# Patient Record
Sex: FEMALE | Race: WHITE | NOT HISPANIC OR LATINO
[De-identification: names, ages, dates, MRNs, and addresses within clinical notes are randomized per-mention and may not be internally consistent; named-entity substitution may affect disease eponyms.]

---

## 2019-05-24 ENCOUNTER — RX RENEWAL (OUTPATIENT)
Age: 69
End: 2019-05-24

## 2019-05-24 PROBLEM — Z00.00 ENCOUNTER FOR PREVENTIVE HEALTH EXAMINATION: Status: ACTIVE | Noted: 2019-05-24

## 2019-06-10 ENCOUNTER — OTHER (OUTPATIENT)
Age: 69
End: 2019-06-10

## 2019-06-14 ENCOUNTER — APPOINTMENT (OUTPATIENT)
Dept: ORTHOPEDIC SURGERY | Facility: CLINIC | Age: 69
End: 2019-06-14
Payer: MEDICARE

## 2019-06-14 VITALS — HEIGHT: 63 IN | WEIGHT: 159 LBS | BODY MASS INDEX: 28.17 KG/M2

## 2019-06-14 PROCEDURE — 20610 DRAIN/INJ JOINT/BURSA W/O US: CPT | Mod: 50

## 2019-06-14 PROCEDURE — 99212 OFFICE O/P EST SF 10 MIN: CPT | Mod: 25

## 2019-06-14 NOTE — HISTORY OF PRESENT ILLNESS
[de-identified] : This patient is here for followup evaluation of both knees. She has osteoarthritis. She's been treated with Orthovisc in the past. The pain is moderate.

## 2019-06-14 NOTE — PHYSICAL EXAM
[de-identified] : bilateral knee examination shows no evidence of warmth or swelling. There is crepitus on range of motion. There is no instability. There is mild joint line tenderness. Neurovascular exam is normal.

## 2019-06-14 NOTE — DISCUSSION/SUMMARY
[de-identified] : The first Orthovisc injection was given into each knee. The patient will follow up in one week for the next injection.

## 2019-06-14 NOTE — REVIEW OF SYSTEMS
[Arthralgia] : arthralgia [Joint Swelling] : no joint swelling [Joint Stiffness] : no joint stiffness [Joint Pain] : joint pain [Negative] : Heme/Lymph

## 2019-06-24 ENCOUNTER — APPOINTMENT (OUTPATIENT)
Dept: ORTHOPEDIC SURGERY | Facility: CLINIC | Age: 69
End: 2019-06-24
Payer: MEDICARE

## 2019-06-24 VITALS — BODY MASS INDEX: 28.17 KG/M2 | WEIGHT: 159 LBS | HEIGHT: 63 IN

## 2019-06-24 PROCEDURE — 99213 OFFICE O/P EST LOW 20 MIN: CPT | Mod: 25

## 2019-06-24 PROCEDURE — 20610 DRAIN/INJ JOINT/BURSA W/O US: CPT | Mod: 50

## 2019-06-24 NOTE — REVIEW OF SYSTEMS
[Arthralgia] : arthralgia [Joint Pain] : joint pain [Joint Stiffness] : no joint stiffness [Joint Swelling] : no joint swelling [Negative] : Endocrine

## 2019-06-24 NOTE — DISCUSSION/SUMMARY
[de-identified] : Bilateral knee injections were given with Orthovisc. This was her second injection. She'll follow up in one week

## 2019-06-24 NOTE — HISTORY OF PRESENT ILLNESS
[de-identified] : Here for bilateral knee pain. His pain with walking up and down stairs no buckling or locking. She had her first Orthovisc injection last week.

## 2019-06-24 NOTE — PHYSICAL EXAM
[de-identified] : Her knee exam shows full range of motion. Crepitus no instability negative Veronique neurovascular exam is normal.

## 2019-06-28 ENCOUNTER — APPOINTMENT (OUTPATIENT)
Dept: ORTHOPEDIC SURGERY | Facility: CLINIC | Age: 69
End: 2019-06-28
Payer: MEDICARE

## 2019-06-28 VITALS — HEIGHT: 63 IN | WEIGHT: 159 LBS | BODY MASS INDEX: 28.17 KG/M2

## 2019-06-28 PROCEDURE — 99212 OFFICE O/P EST SF 10 MIN: CPT | Mod: 25

## 2019-06-28 PROCEDURE — 20610 DRAIN/INJ JOINT/BURSA W/O US: CPT | Mod: 50

## 2019-06-28 NOTE — REVIEW OF SYSTEMS
[Arthralgia] : arthralgia [Joint Pain] : joint pain [Joint Stiffness] : no joint stiffness [Joint Swelling] : no joint swelling [Negative] : Heme/Lymph

## 2019-06-28 NOTE — HISTORY OF PRESENT ILLNESS
[de-identified] : Followup of both knees. She's had 2 Orthovisc injections. She is here for the third.

## 2019-06-28 NOTE — PHYSICAL EXAM
[de-identified] : Bilateral knee exam shows no warmth or swelling. There is joint line tenderness negative Veronique no instability neurovascular exam is normal no crepitus

## 2020-01-24 ENCOUNTER — APPOINTMENT (OUTPATIENT)
Dept: ORTHOPEDIC SURGERY | Facility: CLINIC | Age: 70
End: 2020-01-24
Payer: MEDICARE

## 2020-01-24 VITALS — HEIGHT: 63 IN | WEIGHT: 159 LBS | BODY MASS INDEX: 28.17 KG/M2

## 2020-01-24 DIAGNOSIS — M25.572 PAIN IN LEFT ANKLE AND JOINTS OF LEFT FOOT: ICD-10-CM

## 2020-01-24 PROCEDURE — 73630 X-RAY EXAM OF FOOT: CPT | Mod: LT

## 2020-01-24 PROCEDURE — 99213 OFFICE O/P EST LOW 20 MIN: CPT

## 2020-01-24 NOTE — HISTORY OF PRESENT ILLNESS
[FreeTextEntry1] : Location: left foot\par Quality:  aching\par Duration:01/15/2020\par Context: cabinet fell on foot\par Aggravating Factors:  walking, standing\par Conservative treatment:  rest\par Associated Symptoms:  swelling, bruising\par Prior Studies: n.a\par

## 2020-01-24 NOTE — DISCUSSION/SUMMARY
[de-identified] : This patient has a fifth toe proximal phalanx fracture of the left foot. She will give it time to heal by itself on its own. No swelling she will elevate and ice. I am going to order gel injections for both knees.

## 2020-01-24 NOTE — PHYSICAL EXAM
[de-identified] : Left foot x-ray shows a minimally displaced fracture of the proximal phalanx of the fifth toe

## 2020-02-14 ENCOUNTER — APPOINTMENT (OUTPATIENT)
Dept: ORTHOPEDIC SURGERY | Facility: CLINIC | Age: 70
End: 2020-02-14
Payer: MEDICARE

## 2020-02-14 PROCEDURE — 99212 OFFICE O/P EST SF 10 MIN: CPT | Mod: 25

## 2020-02-14 PROCEDURE — 20610 DRAIN/INJ JOINT/BURSA W/O US: CPT | Mod: 50

## 2020-02-14 NOTE — PHYSICAL EXAM
[Normal] : Gait: normal [de-identified] : Bilateral knee exam shows no warmth or swelling. There is crepitus or joint line pain. There is negative Veronique. Neurovascular exam is normal.

## 2020-02-14 NOTE — DISCUSSION/SUMMARY
[Medication Risks Reviewed] : Medication risks reviewed [de-identified] : The first Orthovisc injection was given into each knee today. Followup in one week. Postinjection instructions given

## 2020-02-14 NOTE — HISTORY OF PRESENT ILLNESS
[de-identified] : This patient has bilateral knee arthritis. She's gotten some relief with Orthovisc. This being done about every 6 months in both knees. The pain is moderate at this point. She's not thinking about surgery at this time. [Pain Location] : pain [Stable] : stable

## 2020-02-14 NOTE — DISCUSSION/SUMMARY
[Medication Risks Reviewed] : Medication risks reviewed [de-identified] : The first Orthovisc injection was given into each knee today. Followup in one week. Postinjection instructions given

## 2020-02-14 NOTE — PHYSICAL EXAM
[Normal] : Gait: normal [de-identified] : Bilateral knee exam shows no warmth or swelling. There is crepitus or joint line pain. There is negative Veronique. Neurovascular exam is normal.

## 2020-02-21 ENCOUNTER — APPOINTMENT (OUTPATIENT)
Dept: ORTHOPEDIC SURGERY | Facility: CLINIC | Age: 70
End: 2020-02-21
Payer: MEDICARE

## 2020-02-21 PROCEDURE — 20610 DRAIN/INJ JOINT/BURSA W/O US: CPT | Mod: 50

## 2020-02-21 PROCEDURE — 99212 OFFICE O/P EST SF 10 MIN: CPT | Mod: 25

## 2020-02-21 NOTE — PHYSICAL EXAM
[Normal] : Gait: normal [de-identified] : Bilateral knee exam shows no warmth or swelling. There is crepitus or joint line pain. There is negative Veronique. Neurovascular exam is normal.

## 2020-02-21 NOTE — REVIEW OF SYSTEMS
[Negative] : Heme/Lymph [Arthralgia] : arthralgia [Joint Pain] : joint pain [Joint Swelling] : no joint swelling

## 2020-02-21 NOTE — DISCUSSION/SUMMARY
[Medication Risks Reviewed] : Medication risks reviewed [de-identified] : The second Orthovisc injection was given into each knee today. Followup in one week. Postinjection instructions given

## 2020-02-21 NOTE — HISTORY OF PRESENT ILLNESS
[de-identified] : She is here for followup of both knees. She had the first injection of Orthovisc last week. Pain is slightly better. She is here for the second [Pain Location] : pain [Improving] : improving

## 2020-03-09 ENCOUNTER — APPOINTMENT (OUTPATIENT)
Dept: ORTHOPEDIC SURGERY | Facility: CLINIC | Age: 70
End: 2020-03-09
Payer: MEDICARE

## 2020-03-09 PROCEDURE — 20610 DRAIN/INJ JOINT/BURSA W/O US: CPT | Mod: 50

## 2020-03-09 PROCEDURE — 99212 OFFICE O/P EST SF 10 MIN: CPT | Mod: 25

## 2020-03-09 NOTE — DISCUSSION/SUMMARY
[Medication Risks Reviewed] : Medication risks reviewed [de-identified] : The third Orthovisc injection was given into each knee today. Followup in 6 months . Postinjection instructions given PAINFUL URINATION/BURNING URINATION/PAIN/URINARY FREQUENCY/DYSURIA

## 2020-03-09 NOTE — PHYSICAL EXAM
[Normal] : Gait: normal [de-identified] : Bilateral knee exam shows no warmth or swelling. There is crepitus or joint line pain. There is negative Veronique. Neurovascular exam is normal.

## 2020-03-09 NOTE — HISTORY OF PRESENT ILLNESS
[de-identified] : She is here for followup of both knees. She's had 2 Orthovisc injections. She is here for the third. Symptoms are better. [Pain Location] : pain [Improving] : improving

## 2020-06-16 ENCOUNTER — TRANSCRIPTION ENCOUNTER (OUTPATIENT)
Age: 70
End: 2020-06-16

## 2020-09-11 ENCOUNTER — APPOINTMENT (OUTPATIENT)
Dept: ORTHOPEDIC SURGERY | Facility: CLINIC | Age: 70
End: 2020-09-11
Payer: MEDICARE

## 2020-09-11 VITALS — HEIGHT: 63 IN | WEIGHT: 159 LBS | BODY MASS INDEX: 28.17 KG/M2

## 2020-09-11 VITALS — TEMPERATURE: 95.2 F

## 2020-09-11 PROCEDURE — 99212 OFFICE O/P EST SF 10 MIN: CPT | Mod: 25

## 2020-09-11 PROCEDURE — 20610 DRAIN/INJ JOINT/BURSA W/O US: CPT | Mod: 50

## 2020-09-11 NOTE — DISCUSSION/SUMMARY
[Medication Risks Reviewed] : Medication risks reviewed [de-identified] : Injection given into both knees today. Followup will be in one week.

## 2020-09-11 NOTE — HISTORY OF PRESENT ILLNESS
[Pain Location] : pain [de-identified] : She is here for followup of both knees. She has osteoarthritis of her she's got no relief with Orthovisc. She is here for the first injection. Pain is unmanageable. [Stable] : stable

## 2020-09-11 NOTE — PHYSICAL EXAM
[de-identified] : Bilateral knee exam shows no warmth or swelling. There is crepitus or joint line pain. There is negative Veronique. Neurovascular exam is normal. [Normal] : Gait: normal

## 2020-09-18 ENCOUNTER — APPOINTMENT (OUTPATIENT)
Dept: ORTHOPEDIC SURGERY | Facility: CLINIC | Age: 70
End: 2020-09-18
Payer: MEDICARE

## 2020-09-18 VITALS — BODY MASS INDEX: 28.17 KG/M2 | WEIGHT: 159 LBS | HEIGHT: 63 IN

## 2020-09-18 PROCEDURE — 20610 DRAIN/INJ JOINT/BURSA W/O US: CPT | Mod: 50

## 2020-09-18 PROCEDURE — 99212 OFFICE O/P EST SF 10 MIN: CPT | Mod: 25

## 2020-09-18 NOTE — PHYSICAL EXAM
[Normal] : Gait: normal [de-identified] : Bilateral knee exam shows no warmth or swelling. There is crepitus or joint line pain. There is negative Veronique. Neurovascular exam is normal.

## 2020-09-18 NOTE — HISTORY OF PRESENT ILLNESS
[de-identified] : She is here for followup of both knees. She has osteoarthritis of her she's got no relief with Orthovisc. She is here for the second injection. Pain is manageable. [Pain Location] : pain [Stable] : stable

## 2020-09-18 NOTE — DISCUSSION/SUMMARY
[Medication Risks Reviewed] : Medication risks reviewed [de-identified] : Injection given into both knees today. Followup will be in one week for the third injection.

## 2020-09-21 NOTE — HISTORY OF PRESENT ILLNESS
[de-identified] : She is here for followup of both knees. She has osteoarthritis of her she's got no relief with Orthovisc. She is here for the second injection. Pain is manageable.

## 2020-09-21 NOTE — DISCUSSION/SUMMARY
[Medication Risks Reviewed] : Medication risks reviewed [de-identified] : Injection given into both knees today. Followup will be in one week for the third injection.

## 2020-09-21 NOTE — PHYSICAL EXAM
[Normal] : Gait: normal [de-identified] : Bilateral knee exam shows no warmth or swelling. There is crepitus or joint line pain. There is negative Veronique. Neurovascular exam is normal.

## 2020-09-28 ENCOUNTER — APPOINTMENT (OUTPATIENT)
Dept: ORTHOPEDIC SURGERY | Facility: CLINIC | Age: 70
End: 2020-09-28
Payer: MEDICARE

## 2020-09-28 PROCEDURE — 99212 OFFICE O/P EST SF 10 MIN: CPT | Mod: 25

## 2020-09-28 PROCEDURE — 20610 DRAIN/INJ JOINT/BURSA W/O US: CPT | Mod: 50

## 2020-09-28 NOTE — HISTORY OF PRESENT ILLNESS
[de-identified] : She is here for followup of both knees. She has osteoarthritis of her knees.  relief with Orthovisc. She is here for the third injection. Pain is manageable. [Pain Location] : pain [Improving] : improving

## 2020-09-28 NOTE — DISCUSSION/SUMMARY
[de-identified] : Third Orthovisc injection was given into each knee today. The patient tolerated. Postinjection instructions given. Followup in 6 months

## 2020-09-28 NOTE — PHYSICAL EXAM
[Normal] : Gait: normal [de-identified] : Bilateral knee exam shows no warmth or swelling. There is crepitus or joint line pain. There is negative Veronique. Neurovascular exam is normal.

## 2021-04-01 ENCOUNTER — APPOINTMENT (OUTPATIENT)
Dept: ORTHOPEDIC SURGERY | Facility: CLINIC | Age: 71
End: 2021-04-01
Payer: MEDICARE

## 2021-04-01 PROCEDURE — 99212 OFFICE O/P EST SF 10 MIN: CPT | Mod: 25

## 2021-04-01 PROCEDURE — 20610 DRAIN/INJ JOINT/BURSA W/O US: CPT | Mod: 50

## 2021-04-01 NOTE — PHYSICAL EXAM
[Normal] : Gait: normal [de-identified] : Bilateral knee exam shows no warmth or swelling. There is crepitus or joint line pain. There is negative Veronique. Neurovascular exam is normal.

## 2021-04-01 NOTE — HISTORY OF PRESENT ILLNESS
[de-identified] : This patient is here for followup evaluation. She has bilateral knee osteoarthritis. She has gotten relief with Orthovisc injections. She is managing his symptoms at this time. [Pain Location] : pain [Stable] : stable

## 2021-04-01 NOTE — DISCUSSION/SUMMARY
[de-identified] : First Orthovisc injection was given into each knee today. The patient tolerated. Postinjection instructions given. Followup in one week

## 2021-04-08 ENCOUNTER — APPOINTMENT (OUTPATIENT)
Dept: ORTHOPEDIC SURGERY | Facility: CLINIC | Age: 71
End: 2021-04-08
Payer: MEDICARE

## 2021-04-08 PROCEDURE — 99212 OFFICE O/P EST SF 10 MIN: CPT | Mod: 25

## 2021-04-08 PROCEDURE — 20610 DRAIN/INJ JOINT/BURSA W/O US: CPT | Mod: 50

## 2021-04-08 NOTE — HISTORY OF PRESENT ILLNESS
[de-identified] : This patient is here for followup evaluation. She has bilateral knee osteoarthritis. She has gotten relief with Orthovisc injections. She is managing his symptoms at this time. [Pain Location] : pain [Stable] : stable

## 2021-04-08 NOTE — DISCUSSION/SUMMARY
[Medication Risks Reviewed] : Medication risks reviewed [de-identified] : Second Orthovisc injection was given into each knee today. The patient tolerated. Postinjection instructions given. Followup in one week\par prescriptions for cyclobenzaprine and diclofenac gel were sent.

## 2021-04-08 NOTE — PHYSICAL EXAM
[Normal] : Gait: normal [de-identified] : Bilateral knee exam shows no warmth or swelling. There is crepitus or joint line pain. There is negative Veronique. Neurovascular exam is normal.

## 2021-04-15 ENCOUNTER — APPOINTMENT (OUTPATIENT)
Dept: ORTHOPEDIC SURGERY | Facility: CLINIC | Age: 71
End: 2021-04-15
Payer: MEDICARE

## 2021-04-15 PROCEDURE — 99212 OFFICE O/P EST SF 10 MIN: CPT | Mod: 25

## 2021-04-15 PROCEDURE — 20610 DRAIN/INJ JOINT/BURSA W/O US: CPT | Mod: 50

## 2021-04-15 NOTE — PHYSICAL EXAM
[Normal] : Gait: normal [de-identified] : Bilateral knee exam shows no warmth or swelling. There is crepitus or joint line pain. There is negative Veronique. Neurovascular exam is normal.

## 2021-04-15 NOTE — HISTORY OF PRESENT ILLNESS
[de-identified] : This patient is here for followup evaluation. She has bilateral knee osteoarthritis. She has gotten relief with Orthovisc injections. She is managing his symptoms at this time. [Pain Location] : pain [Stable] : stable

## 2021-04-15 NOTE — DISCUSSION/SUMMARY
[Medication Risks Reviewed] : Medication risks reviewed [de-identified] : The third Orthovisc injection was given into both knees today. Postinjection instructions given. Follow up will be in 6 months.

## 2021-05-09 NOTE — REASON FOR VISIT
[Follow-Up Visit] : a follow-up visit for [Knee Pain] : knee pain There are no Wet Read(s) to document.

## 2021-06-02 ENCOUNTER — TRANSCRIPTION ENCOUNTER (OUTPATIENT)
Age: 71
End: 2021-06-02

## 2021-06-10 ENCOUNTER — TRANSCRIPTION ENCOUNTER (OUTPATIENT)
Age: 71
End: 2021-06-10

## 2021-08-02 ENCOUNTER — TRANSCRIPTION ENCOUNTER (OUTPATIENT)
Age: 71
End: 2021-08-02

## 2021-08-04 ENCOUNTER — APPOINTMENT (OUTPATIENT)
Dept: ORTHOPEDIC SURGERY | Facility: CLINIC | Age: 71
End: 2021-08-04
Payer: MEDICARE

## 2021-08-04 VITALS — WEIGHT: 159 LBS | HEIGHT: 63 IN | BODY MASS INDEX: 28.17 KG/M2

## 2021-08-04 DIAGNOSIS — M75.52 BURSITIS OF LEFT SHOULDER: ICD-10-CM

## 2021-08-04 PROCEDURE — 99214 OFFICE O/P EST MOD 30 MIN: CPT

## 2021-08-04 PROCEDURE — 73060 X-RAY EXAM OF HUMERUS: CPT | Mod: LT

## 2021-08-04 NOTE — HISTORY OF PRESENT ILLNESS
[de-identified] : Location: left arm/ shoulder\par Quality:  aching \par Duration: 1 month\par Context:  broke her fall with arm \par Aggravating Factors:  ROM , overhead lifting , reaching behind back \par Conservative treatment:  OTC medication , PT \par Associated Symptoms: Stiffness, pain radiating to neck  \par Prior Studies: n.a \par  [Pain Location] : pain [Worsening] : worsening

## 2021-08-04 NOTE — DISCUSSION/SUMMARY
[de-identified] : I am concerned that she may have a left rotator cuff tear. I would have her do an MRI. I will call her with the results. Follow up by telephone.

## 2021-08-04 NOTE — PHYSICAL EXAM
[de-identified] : Left shoulder shows no obvious warmth or swelling. There is tenderness about the glenohumeral joint. She has restricted active range of motion she has weakness of her rotator cuff and forward flexion and external rotation. Internal rotation is preserved. Neurovascular exam is normal. Positive Neer and positive Ellis test [de-identified] : Left humerus x-ray shows no evidence of fracture dislocation

## 2021-08-10 ENCOUNTER — APPOINTMENT (OUTPATIENT)
Dept: ORTHOPEDIC SURGERY | Facility: CLINIC | Age: 71
End: 2021-08-10
Payer: MEDICARE

## 2021-08-10 DIAGNOSIS — M19.019 PRIMARY OSTEOARTHRITIS, UNSPECIFIED SHOULDER: ICD-10-CM

## 2021-08-10 PROCEDURE — 99213 OFFICE O/P EST LOW 20 MIN: CPT

## 2021-08-11 DIAGNOSIS — M75.50 BURSITIS OF UNSPECIFIED SHOULDER: ICD-10-CM

## 2021-08-11 DIAGNOSIS — M75.100 UNSPECIFIED ROTATOR CUFF TEAR OR RUPTURE OF UNSPECIFIED SHOULDER, NOT SPECIFIED AS TRAUMATIC: ICD-10-CM

## 2021-08-11 PROBLEM — M19.019 SHOULDER ARTHRITIS: Status: ACTIVE | Noted: 2021-08-11

## 2021-08-11 NOTE — ASSESSMENT
[FreeTextEntry1] : Lengthy discussion with the patient regarding MRI symptoms history as well as underlying arthritis noticed an MRI.  Treatment options are reviewed in discussion with the patient for 20 minutes regards to nonoperative versus operative treatments.  Recommend arthroscopic rotator cuff repair given the severity of the tendon tears as well as her symptoms.  Discussed the unit for discomfort surgery undertaken she may still have some baseline discomfort given the underlying arthritis and ultimately may require a we'll shoulder replacement were reversed shoulder replacement.  At this time patient would not like to consider any type of arthroplasty and will consider her options in regards to arthroscopic surgery.  Patient informed of risks of delayed to surgery for increased tendon tear size.\par

## 2021-08-11 NOTE — HISTORY OF PRESENT ILLNESS
[de-identified] : Patient is an established patient seen by Dr. Robert with LEFT shoulder pain s/p fall.  She reports persistent discomfort despite rest and anti-inflammatory medication.  She complains of weakness with any overhead use as well.  She denies pain prior to this fall.  She is presenting for further evaluation after MRI was obtained.

## 2021-08-11 NOTE — PHYSICAL EXAM
[de-identified] : Left Shoulder:\par Constitutional:\par The patient is healthy-appearing and in no apparent distress. \par \par Cardiovascular System: \par The capillary refill is less than 2 seconds. \par \par Skin: \par There are no skin abnormalities.\par \par C-Spine/Neck:\par \par Active Range of Motion:\par Flexion				50\par Extension			60\par Lateral rotation			80  \par \par Left Shoulder: \par Inspection: \par There is no atrophy, erythema, warmth, swelling.\par There is no scapular winging.\par There is no AC prominence. \par \par Bony Palpation: \par There is no tenderness of the clavicle.\par There is no tenderness of the acromioclavicular joint.\par There is tenderness of the greater tuberosity. \par There is tenderness of the bicipital groove.\par  \par Soft Tissue Palpation: \par There is no tenderness of the trapezius.\par There is no tenderness of the rhomboid.\par There is tenderness of the subacromial bursa. \par \par Active Range of Motion: \par Forward flexion- 				130 \par Abduction-					90\par External rotation at 0 degrees abduction-	70 \par Internal rotation at 0 degrees abduction-	80\par \par Passive Range of Motion: \par Forward flexion- 			160 \par Abduction-				100\par External rotation at 0 deg abduction-	80 \par Internal rotation at 0 deg abduction-	80\par \par Strength:\par Supraspinatus / Abduction                  4/5\par External rotation                                 4/5\par Internal rotation                                  5/5\par \par Special Tests: \par Hawkin's  				Positive \par Neer's  				Positive \par Speed's  				Negative\par AC cross-over 			            Negative\par Landis's  				Negative\par \par Neurological System: \par \par There is normal sensation to light touch C5-T1. \par \par Stability: \par There is no general laxity. \par \par Psychiatric: \par The patient demonstrates a normal mood and affect and is active and alert [de-identified] : MRI LEFT shoulder ( [facility] ) : there is a high grade partial tear with delamination of the supraspinatus tendon as well as a superior subscapularis full-thickness tear.  There is moderate glenohumeral arthritis with areas of focal small severe focal chondral wear.  there is some acromial bursitis and diffuse tendinitis

## 2021-08-24 ENCOUNTER — LABORATORY RESULT (OUTPATIENT)
Age: 71
End: 2021-08-24

## 2021-08-27 ENCOUNTER — APPOINTMENT (OUTPATIENT)
Age: 71
End: 2021-08-27

## 2021-08-31 ENCOUNTER — APPOINTMENT (OUTPATIENT)
Dept: ORTHOPEDIC SURGERY | Facility: CLINIC | Age: 71
End: 2021-08-31

## 2021-10-21 ENCOUNTER — APPOINTMENT (OUTPATIENT)
Dept: ORTHOPEDIC SURGERY | Facility: CLINIC | Age: 71
End: 2021-10-21
Payer: MEDICARE

## 2021-10-21 PROCEDURE — 20610 DRAIN/INJ JOINT/BURSA W/O US: CPT | Mod: 50

## 2021-10-21 NOTE — PHYSICAL EXAM
[de-identified] : bilateral knee exam shows no warmth or swelling there is crepitus some joint line pain no instability negative Veronique's neurovascular exam is normal

## 2021-10-21 NOTE — HISTORY OF PRESENT ILLNESS
[de-identified] : This patient is here for followup evaluation. She has bilateral knee osteoarthritis. She has gotten relief with Orthovisc injections. She is managing his symptoms at this time.

## 2021-10-21 NOTE — PROCEDURE
[de-identified] : After discussion of the risks and benefits, the patient has elected to proceed with an injection. Confirmed that the patient does not have a history of prior adverse reactions, active infections are relevant allergies. There was no erythema, warmth and the skin was clear. The skin was sterilized with alcohol. Ethyl chloride was used as a topical anesthetic. A needle was inserted. The site was injected with Orthovisc injection into each knee. The injection was completed without complication and a bandage was applied. The patient tolerated the procedure well and was given post injection instructions.\par \par Medications:bilateral knee Orthovisc injections given laterally

## 2021-10-21 NOTE — DISCUSSION/SUMMARY
[Medication Risks Reviewed] : Medication risks reviewed [de-identified] : Orthovisc injections into both knees today. Follow up in a week for the next. Postinjection instructions given.

## 2021-10-28 ENCOUNTER — APPOINTMENT (OUTPATIENT)
Dept: ORTHOPEDIC SURGERY | Facility: CLINIC | Age: 71
End: 2021-10-28
Payer: MEDICARE

## 2021-10-28 PROCEDURE — 20610 DRAIN/INJ JOINT/BURSA W/O US: CPT | Mod: 50

## 2021-10-28 NOTE — PHYSICAL EXAM
[de-identified] : bilateral knee exam shows no warmth or swelling there is crepitus some joint line pain no instability negative Veronique's neurovascular exam is normal

## 2021-10-28 NOTE — PROCEDURE
[de-identified] : After discussion of the risks and benefits, the patient has elected to proceed with an injection. Confirmed that the patient does not have a history of prior adverse reactions, active infections are relevant allergies. There was no erythema, warmth and the skin was clear. The skin was sterilized with alcohol. Ethyl chloride was used as a topical anesthetic. A needle was inserted. The site was injected with Orthovisc injection into each knee. The injection was completed without complication and a bandage was applied. The patient tolerated the procedure well and was given post injection instructions.\par \par Medications:bilateral knee Orthovisc injections given laterally

## 2021-10-28 NOTE — DISCUSSION/SUMMARY
[Medication Risks Reviewed] : Medication risks reviewed [de-identified] : A second Orthovisc injection was given to both knees today. Patient tolerated her followup with one week for the third. This injection instructions given

## 2021-11-04 ENCOUNTER — APPOINTMENT (OUTPATIENT)
Dept: ORTHOPEDIC SURGERY | Facility: CLINIC | Age: 71
End: 2021-11-04
Payer: MEDICARE

## 2021-11-04 PROCEDURE — 20610 DRAIN/INJ JOINT/BURSA W/O US: CPT | Mod: 50

## 2021-11-04 NOTE — PROCEDURE
[de-identified] : After discussion of the risks and benefits, the patient has elected to proceed with an injection. Confirmed that the patient does not have a history of prior adverse reactions, active infections are relevant allergies. There was no erythema, warmth and the skin was clear. The skin was sterilized with alcohol. Ethyl chloride was used as a topical anesthetic. A needle was inserted. The site was injected with Orthovisc injection into each knee. The injection was completed without complication and a bandage was applied. The patient tolerated the procedure well and was given post injection instructions.\par \par Medications:bilateral knee Orthovisc injections given laterally

## 2021-11-04 NOTE — PHYSICAL EXAM
[de-identified] : bilateral knee exam shows no warmth or swelling there is crepitus some joint line pain no instability negative Veronique's neurovascular exam is normal

## 2021-11-04 NOTE — DISCUSSION/SUMMARY
[Medication Risks Reviewed] : Medication risks reviewed [de-identified] : Orthovisc injection #3 given in both knees today appear focal been 6 months. Postinjection instructions given.

## 2022-05-03 RX ORDER — HYALURONATE SODIUM 30 MG/2 ML
30 SYRINGE (ML) INTRAARTICULAR
Qty: 6 | Refills: 0 | Status: ACTIVE | OUTPATIENT
Start: 2022-05-03

## 2022-05-05 ENCOUNTER — APPOINTMENT (OUTPATIENT)
Dept: ORTHOPEDIC SURGERY | Facility: CLINIC | Age: 72
End: 2022-05-05
Payer: MEDICARE

## 2022-05-05 VITALS — BODY MASS INDEX: 24.8 KG/M2 | HEIGHT: 63 IN | WEIGHT: 140 LBS

## 2022-05-05 PROCEDURE — 20610 DRAIN/INJ JOINT/BURSA W/O US: CPT | Mod: 50

## 2022-05-05 RX ORDER — HYALURONATE SODIUM 30 MG/2 ML
30 SYRINGE (ML) INTRAARTICULAR
Refills: 0 | Status: COMPLETED | OUTPATIENT
Start: 2022-05-05

## 2022-05-05 RX ORDER — HYALURONATE SODIUM 30 MG/2 ML
30 SYRINGE (ML) INTRAARTICULAR
Qty: 6 | Refills: 0 | Status: COMPLETED | OUTPATIENT
Start: 2020-01-24 | End: 2022-05-05

## 2022-05-05 RX ORDER — HYALURONATE SODIUM 30 MG/2 ML
30 SYRINGE (ML) INTRAARTICULAR
Qty: 6 | Refills: 0 | Status: COMPLETED | OUTPATIENT
Start: 2019-05-24 | End: 2022-05-05

## 2022-05-05 RX ORDER — HYALURONATE SODIUM 30 MG/2 ML
30 SYRINGE (ML) INTRAARTICULAR
Qty: 6 | Refills: 0 | Status: COMPLETED | OUTPATIENT
Start: 2020-08-26 | End: 2022-05-05

## 2022-05-05 RX ORDER — HYALURONATE SODIUM 30 MG/2 ML
30 SYRINGE (ML) INTRAARTICULAR
Qty: 6 | Refills: 0 | Status: COMPLETED | OUTPATIENT
Start: 2021-09-16 | End: 2022-05-05

## 2022-05-05 RX ORDER — DICLOFENAC SODIUM 1% 10 MG/G
1 GEL TOPICAL DAILY
Qty: 2 | Refills: 3 | Status: COMPLETED | COMMUNITY
Start: 2021-04-08 | End: 2022-05-05

## 2022-05-05 RX ORDER — DICLOFENAC SODIUM 1% 10 MG/G
1 GEL TOPICAL DAILY
Qty: 1 | Refills: 3 | Status: ACTIVE | COMMUNITY
Start: 2022-05-05 | End: 1900-01-01

## 2022-05-05 RX ORDER — CYCLOBENZAPRINE HYDROCHLORIDE 10 MG/1
10 TABLET, FILM COATED ORAL 3 TIMES DAILY
Qty: 270 | Refills: 2 | Status: COMPLETED | COMMUNITY
Start: 2021-04-08 | End: 2022-05-05

## 2022-05-05 RX ADMIN — Medication 6 MG/2ML: at 00:00

## 2022-05-05 NOTE — PROCEDURE
[de-identified] : After discussion of the risks and benefits, the patient has elected to proceed with an injection. Confirmed that the patient does not have a history of prior adverse reactions, active infections are relevant allergies. There was no erythema, warmth and the skin was clear. The skin was sterilized with alcohol. Ethyl chloride was used as a topical anesthetic. A needle was inserted. The site was injected with Orthovisc injection into each knee. The injection was completed without complication and a bandage was applied. The patient tolerated the procedure well and was given post injection instructions.\par \par Medications:bilateral knee Orthovisc injections given laterally - all 3 injections done into each knee today at the patient's request

## 2022-05-05 NOTE — PHYSICAL EXAM
[de-identified] : bilateral knee exam shows no warmth or swelling there is crepitus some joint line pain no instability negative Veronique's neurovascular exam is normal

## 2022-05-05 NOTE — DISCUSSION/SUMMARY
[Medication Risks Reviewed] : Medication risks reviewed [de-identified] : She was given Orthovisc injections into both knees today. All 3 injections were done today. Postinjection instructions given. Followup in 6 months.

## 2022-05-05 NOTE — REASON FOR VISIT
[Follow-Up Visit] : a follow-up visit for [Knee Pain] : knee pain [FreeTextEntry2] : bilateral knee pain.

## 2022-05-12 ENCOUNTER — APPOINTMENT (OUTPATIENT)
Dept: ORTHOPEDIC SURGERY | Facility: CLINIC | Age: 72
End: 2022-05-12

## 2022-05-19 ENCOUNTER — APPOINTMENT (OUTPATIENT)
Dept: ORTHOPEDIC SURGERY | Facility: CLINIC | Age: 72
End: 2022-05-19

## 2022-10-20 DIAGNOSIS — M17.0 BILATERAL PRIMARY OSTEOARTHRITIS OF KNEE: ICD-10-CM

## 2022-10-20 RX ORDER — HYALURONATE SODIUM 30 MG/2 ML
30 SYRINGE (ML) INTRAARTICULAR
Qty: 6 | Refills: 0 | Status: ACTIVE | OUTPATIENT
Start: 2022-10-20

## 2025-04-21 NOTE — HISTORY OF PRESENT ILLNESS
Symptoms controlled with current dose of Adderall.  [place] was [place].  Controlled substance agreement signed in December 2024.  Urine tox screen was ordered.  Again recommend patient proceed with blood work as previously ordered.  Call for when due for next refill.   [de-identified] : This patient has bilateral knee arthritis. She's gotten some relief with Orthovisc. This being done about every 6 months in both knees. The pain is moderate at this point. She's not thinking about surgery at this time. [Pain Location] : pain [Stable] : stable